# Patient Record
Sex: FEMALE | Race: WHITE | URBAN - NONMETROPOLITAN AREA
[De-identification: names, ages, dates, MRNs, and addresses within clinical notes are randomized per-mention and may not be internally consistent; named-entity substitution may affect disease eponyms.]

---

## 2020-06-25 ENCOUNTER — TELEPHONE ENCOUNTER (OUTPATIENT)
Dept: URBAN - NONMETROPOLITAN AREA CLINIC 2 | Facility: CLINIC | Age: 70
End: 2020-06-25

## 2020-08-14 ENCOUNTER — LAB OUTSIDE AN ENCOUNTER (OUTPATIENT)
Dept: URBAN - NONMETROPOLITAN AREA CLINIC 13 | Facility: CLINIC | Age: 70
End: 2020-08-14

## 2020-08-14 ENCOUNTER — OFFICE VISIT (OUTPATIENT)
Dept: URBAN - NONMETROPOLITAN AREA CLINIC 13 | Facility: CLINIC | Age: 70
End: 2020-08-14
Payer: MEDICARE

## 2020-08-14 DIAGNOSIS — Z12.11 ROUTINE COLON: ICD-10-CM

## 2020-08-14 DIAGNOSIS — R74.8 ELEVATED LIVER ENZYMES: ICD-10-CM

## 2020-08-14 DIAGNOSIS — R93.5 ABNORMAL CT OF THE ABDOMEN: ICD-10-CM

## 2020-08-14 DIAGNOSIS — K57.92 DIVERTICULITIS: ICD-10-CM

## 2020-08-14 PROCEDURE — 3017F COLORECTAL CA SCREEN DOC REV: CPT | Performed by: INTERNAL MEDICINE

## 2020-08-14 PROCEDURE — 99214 OFFICE O/P EST MOD 30 MIN: CPT | Performed by: INTERNAL MEDICINE

## 2020-08-14 PROCEDURE — G9903 PT SCRN TBCO ID AS NON USER: HCPCS | Performed by: INTERNAL MEDICINE

## 2020-08-14 PROCEDURE — G8427 DOCREV CUR MEDS BY ELIG CLIN: HCPCS | Performed by: INTERNAL MEDICINE

## 2020-08-14 RX ORDER — ESZOPICLONE 2 MG
TAKE 1 TABLET (2 MG) BY ORAL ROUTE ONCE DAILY AT BEDTIME TABLET ORAL 1
Qty: 0 | Refills: 0 | Status: ACTIVE | COMMUNITY
Start: 1900-01-01

## 2020-08-14 NOTE — HPI-TODAY'S VISIT:
The patient presents today for follow-up of diverticulitis and elevated liver enzymes.  For the past 6 to 12 months, she has been feeling like her bowels have not been normal.  This past June, she started developing severe abdominal pain.  Her primary care physician referred her to urology.  She then underwent a noncontrast CT scan that revealed perforated diverticulitis.  She took antibiotics for 2 weeks, and since that time, she has been feeling much better.  She is now taking occasional MiraLAX for her bowels.  She denies any blood in her stool.  She is willing to have a repeat colonoscopy at this time.  She does feel like things have gotten back to normal.  Also, while she was having an acute flare, she did have blood work done that revealed elevated liver enzymes.  This is the first time she is ever been noted to have abnormal labs.  She has not had them recently checked since June.  Overall, she is feeling much better today.

## 2020-09-09 ENCOUNTER — CLAIMS CREATED FROM THE CLAIM WINDOW (OUTPATIENT)
Dept: URBAN - METROPOLITAN AREA CLINIC 4 | Facility: CLINIC | Age: 70
End: 2020-09-09
Payer: MEDICARE

## 2020-09-09 ENCOUNTER — OFFICE VISIT (OUTPATIENT)
Dept: URBAN - NONMETROPOLITAN AREA SURGERY CENTER 1 | Facility: SURGERY CENTER | Age: 70
End: 2020-09-09
Payer: MEDICARE

## 2020-09-09 DIAGNOSIS — D12.2 ADENOMA OF ASCENDING COLON: ICD-10-CM

## 2020-09-09 DIAGNOSIS — D12.2 BENIGN NEOPLASM OF ASCENDING COLON: ICD-10-CM

## 2020-09-09 DIAGNOSIS — K57.30 ACQUIRED DIVERTICULOSIS OF COLON: ICD-10-CM

## 2020-09-09 PROCEDURE — G8907 PT DOC NO EVENTS ON DISCHARG: HCPCS | Performed by: INTERNAL MEDICINE

## 2020-09-09 PROCEDURE — G9937 DIG OR SURV COLSCO: HCPCS | Performed by: INTERNAL MEDICINE

## 2020-09-09 PROCEDURE — 45385 COLONOSCOPY W/LESION REMOVAL: CPT | Performed by: INTERNAL MEDICINE

## 2020-09-09 PROCEDURE — 88305 TISSUE EXAM BY PATHOLOGIST: CPT | Performed by: PATHOLOGY

## 2020-10-30 ENCOUNTER — OFFICE VISIT (OUTPATIENT)
Dept: URBAN - NONMETROPOLITAN AREA CLINIC 13 | Facility: CLINIC | Age: 70
End: 2020-10-30

## 2020-10-30 RX ORDER — ESZOPICLONE 2 MG
TAKE 1 TABLET (2 MG) BY ORAL ROUTE ONCE DAILY AT BEDTIME TABLET ORAL 1
Qty: 0 | Refills: 0 | Status: ACTIVE | COMMUNITY
Start: 1900-01-01

## 2021-01-15 ENCOUNTER — OFFICE VISIT (OUTPATIENT)
Dept: URBAN - NONMETROPOLITAN AREA CLINIC 13 | Facility: CLINIC | Age: 71
End: 2021-01-15

## 2021-02-04 ENCOUNTER — OFFICE VISIT (OUTPATIENT)
Dept: URBAN - METROPOLITAN AREA TELEHEALTH 2 | Facility: TELEHEALTH | Age: 71
End: 2021-02-04
Payer: MEDICARE

## 2021-02-04 DIAGNOSIS — R74.8 ELEVATED LIVER ENZYMES: ICD-10-CM

## 2021-02-04 DIAGNOSIS — K57.92 DIVERTICULITIS: ICD-10-CM

## 2021-02-04 DIAGNOSIS — K59.01 CONSTIPATION: ICD-10-CM

## 2021-02-04 DIAGNOSIS — Z12.11 ROUTINE COLON: ICD-10-CM

## 2021-02-04 PROCEDURE — 3017F COLORECTAL CA SCREEN DOC REV: CPT | Performed by: NURSE PRACTITIONER

## 2021-02-04 PROCEDURE — G9902 PT SCRN TBCO AND ID AS USER: HCPCS | Performed by: NURSE PRACTITIONER

## 2021-02-04 PROCEDURE — G9906 PT RECV TBCO CESS INTERV: HCPCS | Performed by: NURSE PRACTITIONER

## 2021-02-04 PROCEDURE — G8427 DOCREV CUR MEDS BY ELIG CLIN: HCPCS | Performed by: NURSE PRACTITIONER

## 2021-02-04 PROCEDURE — 99213 OFFICE O/P EST LOW 20 MIN: CPT | Performed by: NURSE PRACTITIONER

## 2021-02-04 PROCEDURE — 4004F PT TOBACCO SCREEN RCVD TLK: CPT | Performed by: NURSE PRACTITIONER

## 2021-02-04 RX ORDER — ESZOPICLONE 2 MG
TAKE 1 TABLET (2 MG) BY ORAL ROUTE ONCE DAILY AT BEDTIME TABLET ORAL 1
Qty: 0 | Refills: 0 | Status: ACTIVE | COMMUNITY
Start: 1900-01-01

## 2021-02-04 RX ORDER — PLECANATIDE 3 MG/1
1 TABLET TABLET ORAL ONCE A DAY
Qty: 90 TABLET | Refills: 3 | OUTPATIENT
Start: 2021-02-04 | End: 2022-01-30

## 2021-02-04 NOTE — HPI-TODAY'S VISIT:
The patient presents today for follow-up of diverticulitis and elevated liver enzymes.  For the past 6 to 12 months, she has been feeling like her bowels have not been normal.  This past June, she started developing severe abdominal pain.  Her primary care physician referred her to urology.  She then underwent a noncontrast CT scan that revealed perforated diverticulitis.  She took antibiotics for 2 weeks, and since that time, she has been feeling much better.  She is now taking occasional MiraLAX for her bowels.  She denies any blood in her stool.  She is willing to have a repeat colonoscopy at this time.  She does feel like things have gotten back to normal.  Also, while she was having an acute flare, she did have blood work done that revealed elevated liver enzymes.  This is the first time she is ever been noted to have abnormal labs.  She has not had them recently checked since June.  Overall, she is feeling much better today.   2/4/2021 Juliana presents for follow up of diverticulitis with microperforation and elevated liver enzymes. Her repeat colonoscopy reveals one TA and left sided diverticulosis. She is taking jon, fiber and hydration. She is still going several days with no significant BM. She is interested in meeting with a surgeon regarding her history of perforated diverticulitis. Today she is doing fairly well otherwise. MB

## 2021-02-08 ENCOUNTER — WEB ENCOUNTER (OUTPATIENT)
Dept: URBAN - NONMETROPOLITAN AREA CLINIC 2 | Facility: CLINIC | Age: 71
End: 2021-02-08

## 2021-02-11 ENCOUNTER — WEB ENCOUNTER (OUTPATIENT)
Dept: URBAN - NONMETROPOLITAN AREA CLINIC 13 | Facility: CLINIC | Age: 71
End: 2021-02-11

## 2021-02-17 ENCOUNTER — OFFICE VISIT (OUTPATIENT)
Dept: URBAN - METROPOLITAN AREA TELEHEALTH 2 | Facility: TELEHEALTH | Age: 71
End: 2021-02-17

## 2021-03-07 ENCOUNTER — WEB ENCOUNTER (OUTPATIENT)
Dept: URBAN - NONMETROPOLITAN AREA CLINIC 13 | Facility: CLINIC | Age: 71
End: 2021-03-07

## 2021-03-10 ENCOUNTER — OFFICE VISIT (OUTPATIENT)
Dept: URBAN - METROPOLITAN AREA TELEHEALTH 2 | Facility: TELEHEALTH | Age: 71
End: 2021-03-10
Payer: MEDICARE

## 2021-03-10 DIAGNOSIS — K59.01 CONSTIPATION: ICD-10-CM

## 2021-03-10 DIAGNOSIS — Z12.11 ROUTINE COLON: ICD-10-CM

## 2021-03-10 DIAGNOSIS — K57.92 DIVERTICULITIS: ICD-10-CM

## 2021-03-10 DIAGNOSIS — R74.8 ELEVATED LIVER ENZYMES: ICD-10-CM

## 2021-03-10 PROCEDURE — 99213 OFFICE O/P EST LOW 20 MIN: CPT | Performed by: NURSE PRACTITIONER

## 2021-03-10 RX ORDER — ESZOPICLONE 2 MG
TAKE 1 TABLET (2 MG) BY ORAL ROUTE ONCE DAILY AT BEDTIME TABLET ORAL 1
Qty: 0 | Refills: 0 | Status: ACTIVE | COMMUNITY
Start: 1900-01-01

## 2021-03-10 RX ORDER — PLECANATIDE 3 MG/1
1 TABLET TABLET ORAL ONCE A DAY
Qty: 90 TABLET | Refills: 3 | Status: ACTIVE | COMMUNITY
Start: 2021-02-04 | End: 2022-01-30

## 2021-03-10 NOTE — HPI-TODAY'S VISIT:
The patient presents today for follow-up of diverticulitis and elevated liver enzymes.  For the past 6 to 12 months, she has been feeling like her bowels have not been normal.  This past June, she started developing severe abdominal pain.  Her primary care physician referred her to urology.  She then underwent a noncontrast CT scan that revealed perforated diverticulitis.  She took antibiotics for 2 weeks, and since that time, she has been feeling much better.  She is now taking occasional MiraLAX for her bowels.  She denies any blood in her stool.  She is willing to have a repeat colonoscopy at this time.  She does feel like things have gotten back to normal.  Also, while she was having an acute flare, she did have blood work done that revealed elevated liver enzymes.  This is the first time she is ever been noted to have abnormal labs.  She has not had them recently checked since June.  Overall, she is feeling much better today.   2/4/2021 Juliana presents for follow up of diverticulitis with microperforation and elevated liver enzymes. Her repeat colonoscopy reveals one TA and left sided diverticulosis. She is taking jon, fiber and hydration. She is still going several days with no significant BM. She is interested in meeting with a surgeon regarding her history of perforated diverticulitis. Today she is doing fairly well otherwise. MB   3/10/2021 Juliana presents for follow up of diverticulosis/diverticulitis and alternating constipation/diarrhea. She has filled the Trulance but had severe diarrhea with daily dosing. She is now taking it every other day but is constipated. She is struggling with excessive gas. She is still struggling with her bowels and does plan on pursuing surgery. Today her bowels are still irregular but she is doing better. MB

## 2021-03-18 ENCOUNTER — WEB ENCOUNTER (OUTPATIENT)
Dept: URBAN - NONMETROPOLITAN AREA CLINIC 2 | Facility: CLINIC | Age: 71
End: 2021-03-18

## 2021-03-18 RX ORDER — ESZOPICLONE 2 MG
TAKE 1 TABLET (2 MG) BY ORAL ROUTE ONCE DAILY AT BEDTIME TABLET ORAL 1
Qty: 0 | Refills: 0 | Status: ACTIVE | COMMUNITY
Start: 1900-01-01

## 2021-03-18 RX ORDER — PLECANATIDE 3 MG/1
1 TABLET TABLET ORAL ONCE A DAY
Qty: 90 TABLET | Refills: 3 | Status: ACTIVE | COMMUNITY
Start: 2021-02-04 | End: 2022-01-30

## 2021-03-18 RX ORDER — CEFDINIR 300 MG/1
AS DIRECTED CAPSULE ORAL TID
Qty: 42 CAPSULE | Refills: 0 | OUTPATIENT
Start: 2021-03-18 | End: 2021-04-01

## 2021-03-18 RX ORDER — METRONIDAZOLE 500 MG/1
1 TABLET TABLET, FILM COATED ORAL THREE TIMES A DAY
Qty: 42 TABLET | Refills: 0 | OUTPATIENT
Start: 2021-03-18 | End: 2021-04-01

## 2021-03-19 ENCOUNTER — WEB ENCOUNTER (OUTPATIENT)
Dept: URBAN - NONMETROPOLITAN AREA CLINIC 2 | Facility: CLINIC | Age: 71
End: 2021-03-19

## 2021-03-19 RX ORDER — METRONIDAZOLE 500 MG/1
1 TABLET TABLET, FILM COATED ORAL THREE TIMES A DAY
Qty: 42 TABLET | Refills: 0
Start: 2021-03-18

## 2021-03-19 RX ORDER — CEFDINIR 300 MG/1
AS DIRECTED CAPSULE ORAL TID
Qty: 42 CAPSULE | Refills: 0
Start: 2021-03-18

## 2021-03-20 LAB
A/G RATIO: 1.5
ALBUMIN: 4.3
ALKALINE PHOSPHATASE: 80
ALT (SGPT): 24
AST (SGOT): 25
BASO (ABSOLUTE): 0
BASOS: 0
BILIRUBIN, TOTAL: 0.4
BUN/CREATININE RATIO: 19
BUN: 21
C-REACTIVE PROTEIN, QUANT: 134
CALCIUM: 9.6
CARBON DIOXIDE, TOTAL: 22
CHLORIDE: 98
CREATININE: 1.12
EGFR IF AFRICN AM: 58
EGFR IF NONAFRICN AM: 50
EOS (ABSOLUTE): 0
EOS: 0
GLOBULIN, TOTAL: 2.9
GLUCOSE: 141
HEMATOCRIT: 44.9
HEMATOLOGY COMMENTS:: (no result)
HEMOGLOBIN: 14.8
IMMATURE CELLS: (no result)
IMMATURE GRANS (ABS): 0
IMMATURE GRANULOCYTES: 0
LYMPHS (ABSOLUTE): 1
LYMPHS: 11
MCH: 29.4
MCHC: 33
MCV: 89
MONOCYTES(ABSOLUTE): 0.7
MONOCYTES: 7
NEUTROPHILS (ABSOLUTE): 7.8
NEUTROPHILS: 82
NRBC: (no result)
PLATELETS: 284
POTASSIUM: 4
PROTEIN, TOTAL: 7.2
RBC: 5.04
RDW: 12.9
SEDIMENTATION RATE-WESTERGREN: 45
SODIUM: 139
WBC: 9.5

## 2021-03-22 ENCOUNTER — WEB ENCOUNTER (OUTPATIENT)
Dept: URBAN - NONMETROPOLITAN AREA CLINIC 2 | Facility: CLINIC | Age: 71
End: 2021-03-22

## 2021-03-22 ENCOUNTER — TELEPHONE ENCOUNTER (OUTPATIENT)
Dept: URBAN - METROPOLITAN AREA CLINIC 92 | Facility: CLINIC | Age: 71
End: 2021-03-22

## 2021-03-23 ENCOUNTER — OFFICE VISIT (OUTPATIENT)
Dept: URBAN - NONMETROPOLITAN AREA CLINIC 2 | Facility: CLINIC | Age: 71
End: 2021-03-23
Payer: MEDICARE

## 2021-03-23 ENCOUNTER — WEB ENCOUNTER (OUTPATIENT)
Dept: URBAN - NONMETROPOLITAN AREA CLINIC 2 | Facility: CLINIC | Age: 71
End: 2021-03-23

## 2021-03-23 DIAGNOSIS — Z12.11 ROUTINE COLON: ICD-10-CM

## 2021-03-23 DIAGNOSIS — K57.92 DIVERTICULITIS: ICD-10-CM

## 2021-03-23 DIAGNOSIS — R93.5 ABNORMAL CT OF THE ABDOMEN: ICD-10-CM

## 2021-03-23 DIAGNOSIS — K59.01 CONSTIPATION: ICD-10-CM

## 2021-03-23 PROCEDURE — 99214 OFFICE O/P EST MOD 30 MIN: CPT | Performed by: NURSE PRACTITIONER

## 2021-03-23 RX ORDER — METRONIDAZOLE 500 MG/1
1 TABLET TABLET, FILM COATED ORAL THREE TIMES A DAY
Qty: 42 TABLET | Refills: 0 | Status: ACTIVE | COMMUNITY
Start: 2021-03-18

## 2021-03-23 RX ORDER — ESZOPICLONE 2 MG
TAKE 1 TABLET (2 MG) BY ORAL ROUTE ONCE DAILY AT BEDTIME TABLET ORAL 1
Qty: 0 | Refills: 0 | Status: ACTIVE | COMMUNITY
Start: 1900-01-01

## 2021-03-23 RX ORDER — CEFDINIR 300 MG/1
AS DIRECTED CAPSULE ORAL TID
Qty: 42 CAPSULE | Refills: 0 | Status: ACTIVE | COMMUNITY
Start: 2021-03-18

## 2021-03-23 RX ORDER — PLECANATIDE 3 MG/1
1 TABLET TABLET ORAL ONCE A DAY
Qty: 90 TABLET | Refills: 3 | Status: ACTIVE | COMMUNITY
Start: 2021-02-04 | End: 2022-01-30

## 2021-03-23 NOTE — HPI-TODAY'S VISIT:
The patient presents today for follow-up of diverticulitis and elevated liver enzymes.  For the past 6 to 12 months, she has been feeling like her bowels have not been normal.  This past June, she started developing severe abdominal pain.  Her primary care physician referred her to urology.  She then underwent a noncontrast CT scan that revealed perforated diverticulitis.  She took antibiotics for 2 weeks, and since that time, she has been feeling much better.  She is now taking occasional MiraLAX for her bowels.  She denies any blood in her stool.  She is willing to have a repeat colonoscopy at this time.  She does feel like things have gotten back to normal.  Also, while she was having an acute flare, she did have blood work done that revealed elevated liver enzymes.  This is the first time she is ever been noted to have abnormal labs.  She has not had them recently checked since June.  Overall, she is feeling much better today.   2/4/2021 Juliana presents for follow up of diverticulitis with microperforation and elevated liver enzymes. Her repeat colonoscopy reveals one TA and left sided diverticulosis. She is taking jon, fiber and hydration. She is still going several days with no significant BM. She is interested in meeting with a surgeon regarding her history of perforated diverticulitis. Today she is doing fairly well otherwise. MB   3/10/2021 Juliana presents for follow up of diverticulosis/diverticulitis and alternating constipation/diarrhea. She has filled the Trulance but had severe diarrhea with daily dosing. She is now taking it every other day but is constipated. She is struggling with excessive gas. She is still struggling with her bowels and does plan on pursuing surgery. Today her bowels are still irregular but she is doing better. MB  3/23/2021 Juliana presents for follow-up of diverticulitis.  Last week she developed recurrent left lower quadrant abdominal pain with severe discomfort.  She called and we started antibiotics.  We did check her labs and her CRP was over 100.  Since starting the antibiotics her pain has improved tremendously.  She is having multiple watery loose and at times incontinent stools.  She is off her MiraLAX and fiber currently she is taking Flagyl and cefdinir.  Today she is feeling better, she did meet with Dr. Grossman regarding surgery and had deferred at her last visit.  Today we discussed her elevated CRP, the fact that this could be due to an abscess, and her risk of perforation given her history of microperforation and elevated CRP.  She agrees to repeat blood work today and consider repeat CT scan pending results.  MB

## 2021-03-24 ENCOUNTER — TELEPHONE ENCOUNTER (OUTPATIENT)
Dept: URBAN - NONMETROPOLITAN AREA CLINIC 2 | Facility: CLINIC | Age: 71
End: 2021-03-24

## 2021-03-24 LAB
A/G RATIO: 1.5
ALBUMIN: 4.4
ALKALINE PHOSPHATASE: 68
ALT (SGPT): 39
AST (SGOT): 66
BASO (ABSOLUTE): 0
BASOS: 0
BILIRUBIN, TOTAL: 0.2
BUN/CREATININE RATIO: 11
BUN: 12
C-REACTIVE PROTEIN, QUANT: 11
CALCIUM: 10.4
CARBON DIOXIDE, TOTAL: 27
CHLORIDE: 104
CREATININE: 1.05
EGFR IF AFRICN AM: 62
EGFR IF NONAFRICN AM: 54
EOS (ABSOLUTE): 0.1
EOS: 1
GLOBULIN, TOTAL: 2.9
GLUCOSE: 120
HEMATOCRIT: 42.8
HEMATOLOGY COMMENTS:: (no result)
HEMOGLOBIN: 14
IMMATURE CELLS: (no result)
IMMATURE GRANS (ABS): 0
IMMATURE GRANULOCYTES: 0
LYMPHS (ABSOLUTE): 1.8
LYMPHS: 27
MCH: 29.4
MCHC: 32.7
MCV: 90
MONOCYTES(ABSOLUTE): 0.6
MONOCYTES: 9
NEUTROPHILS (ABSOLUTE): 4.1
NEUTROPHILS: 63
NRBC: (no result)
PLATELETS: 330
POTASSIUM: 4.9
PROTEIN, TOTAL: 7.3
RBC: 4.77
RDW: 12.7
SEDIMENTATION RATE-WESTERGREN: 10
SODIUM: 146
WBC: 6.6

## 2021-03-24 RX ORDER — METRONIDAZOLE 500 MG/1
1 TABLET TABLET, FILM COATED ORAL THREE TIMES A DAY
Qty: 42 TABLET | Refills: 0
Start: 2021-03-18

## 2021-03-24 RX ORDER — CEFDINIR 300 MG/1
AS DIRECTED CAPSULE ORAL TID
Qty: 42 CAPSULE | Refills: 0
Start: 2021-03-18

## 2021-03-26 ENCOUNTER — WEB ENCOUNTER (OUTPATIENT)
Dept: URBAN - NONMETROPOLITAN AREA CLINIC 2 | Facility: CLINIC | Age: 71
End: 2021-03-26

## 2021-03-29 ENCOUNTER — WEB ENCOUNTER (OUTPATIENT)
Dept: URBAN - NONMETROPOLITAN AREA CLINIC 2 | Facility: CLINIC | Age: 71
End: 2021-03-29

## 2021-03-30 LAB
C DIFFICILE TOXINS A+B, EIA: NEGATIVE
CAMPYLOBACTER CULTURE: (no result)
E COLI SHIGA TOXIN EIA: NEGATIVE
Lab: (no result)
OVA + PARASITE EXAM: (no result)
SALMONELLA/SHIGELLA SCREEN: (no result)
WHITE BLOOD CELLS (WBC), STOOL: (no result)

## 2021-04-01 ENCOUNTER — WEB ENCOUNTER (OUTPATIENT)
Dept: URBAN - NONMETROPOLITAN AREA CLINIC 2 | Facility: CLINIC | Age: 71
End: 2021-04-01

## 2021-04-12 ENCOUNTER — OFFICE VISIT (OUTPATIENT)
Dept: URBAN - NONMETROPOLITAN AREA CLINIC 2 | Facility: CLINIC | Age: 71
End: 2021-04-12
Payer: MEDICARE

## 2021-04-12 DIAGNOSIS — K59.01 CONSTIPATION: ICD-10-CM

## 2021-04-12 DIAGNOSIS — Z12.11 ROUTINE COLON: ICD-10-CM

## 2021-04-12 DIAGNOSIS — K57.92 DIVERTICULITIS: ICD-10-CM

## 2021-04-12 DIAGNOSIS — R93.5 ABNORMAL CT OF THE ABDOMEN: ICD-10-CM

## 2021-04-12 PROCEDURE — 99213 OFFICE O/P EST LOW 20 MIN: CPT | Performed by: NURSE PRACTITIONER

## 2021-04-12 RX ORDER — METRONIDAZOLE 500 MG/1
1 TABLET TABLET, FILM COATED ORAL THREE TIMES A DAY
Qty: 42 TABLET | Refills: 0 | Status: ON HOLD | COMMUNITY
Start: 2021-03-18

## 2021-04-12 RX ORDER — ESZOPICLONE 2 MG
TAKE 1 TABLET (2 MG) BY ORAL ROUTE ONCE DAILY AT BEDTIME TABLET ORAL 1
Qty: 0 | Refills: 0 | Status: ACTIVE | COMMUNITY
Start: 1900-01-01

## 2021-04-12 RX ORDER — CEFDINIR 300 MG/1
AS DIRECTED CAPSULE ORAL TID
Qty: 42 CAPSULE | Refills: 0 | Status: ON HOLD | COMMUNITY
Start: 2021-03-18

## 2021-04-12 RX ORDER — PLECANATIDE 3 MG/1
1 TABLET TABLET ORAL ONCE A DAY
Qty: 90 TABLET | Refills: 3 | Status: ACTIVE | COMMUNITY
Start: 2021-02-04 | End: 2022-01-30

## 2021-04-12 NOTE — HPI-TODAY'S VISIT:
The patient presents today for follow-up of diverticulitis and elevated liver enzymes.  For the past 6 to 12 months, she has been feeling like her bowels have not been normal.  This past June, she started developing severe abdominal pain.  Her primary care physician referred her to urology.  She then underwent a noncontrast CT scan that revealed perforated diverticulitis.  She took antibiotics for 2 weeks, and since that time, she has been feeling much better.  She is now taking occasional MiraLAX for her bowels.  She denies any blood in her stool.  She is willing to have a repeat colonoscopy at this time.  She does feel like things have gotten back to normal.  Also, while she was having an acute flare, she did have blood work done that revealed elevated liver enzymes.  This is the first time she is ever been noted to have abnormal labs.  She has not had them recently checked since June.  Overall, she is feeling much better today.   2/4/2021 Juliana presents for follow up of diverticulitis with microperforation and elevated liver enzymes. Her repeat colonoscopy reveals one TA and left sided diverticulosis. She is taking jon, fiber and hydration. She is still going several days with no significant BM. She is interested in meeting with a surgeon regarding her history of perforated diverticulitis. Today she is doing fairly well otherwise. MB   3/10/2021 Juliana presents for follow up of diverticulosis/diverticulitis and alternating constipation/diarrhea. She has filled the Trulance but had severe diarrhea with daily dosing. She is now taking it every other day but is constipated. She is struggling with excessive gas. She is still struggling with her bowels and does plan on pursuing surgery. Today her bowels are still irregular but she is doing better. MB  3/23/2021 Juliana presents for follow-up of diverticulitis.  Last week she developed recurrent left lower quadrant abdominal pain with severe discomfort.  She called and we started antibiotics.  We did check her labs and her CRP was over 100.  Since starting the antibiotics her pain has improved tremendously.  She is having multiple watery loose and at times incontinent stools.  She is off her MiraLAX and fiber currently she is taking Flagyl and cefdinir.  Today she is feeling better, she did meet with Dr. Grossman regarding surgery and had deferred at her last visit.  Today we discussed her elevated CRP, the fact that this could be due to an abscess, and her risk of perforation given her history of microperforation and elevated CRP.  She agrees to repeat blood work today and consider repeat CT scan pending results.  MB  4/12/2021 Mrs. Adkins presents for follow-up of diverticulitis.  Since her last visit she has completed her antibiotics.  Her bowels are soft and moving daily 1-3 times.  She has not restarted the psyllium but does plan on starting that this evening.  She has increased her hydration, she cannot quite make 64 ounces daily but she can get up to 40 ounces and her bowel movements have improved tremendously.  Her colonoscopy earlier this year reveals 1 TA and left-sided diverticular disease as above.  She does have a history of microperforation on CT scan with recurrent need of antibiotics this year.  Today we had a long discussion regarding recurrent diverticulitis.  She does have an appointment with Dr. Marybeth Recinos next week to discuss surgical options, we did discuss prevention with psyllium fiber and she agrees to take this regularly.  Today she is doing much better with no new GI complaints.  MB

## 2021-04-13 LAB
A/G RATIO: 1.7
ALBUMIN: 4.4
ALKALINE PHOSPHATASE: 54
ALT (SGPT): 30
AST (SGOT): 38
BASO (ABSOLUTE): 0
BASOS: 0
BILIRUBIN, TOTAL: <0.2
BUN/CREATININE RATIO: 15
BUN: 14
CALCIUM: 9.6
CARBON DIOXIDE, TOTAL: 25
CHLORIDE: 100
CREATININE: 0.91
EGFR IF AFRICN AM: 74
EGFR IF NONAFRICN AM: 64
EOS (ABSOLUTE): 0.1
EOS: 1
GLOBULIN, TOTAL: 2.6
GLUCOSE: 98
HEMATOCRIT: 44.2
HEMATOLOGY COMMENTS:: (no result)
HEMOGLOBIN: 14.5
IMMATURE CELLS: (no result)
IMMATURE GRANS (ABS): 0
IMMATURE GRANULOCYTES: 0
LYMPHS (ABSOLUTE): 1.2
LYMPHS: 22
MCH: 30.3
MCHC: 32.8
MCV: 92
MONOCYTES(ABSOLUTE): 0.5
MONOCYTES: 8
NEUTROPHILS (ABSOLUTE): 3.9
NEUTROPHILS: 69
NRBC: (no result)
PLATELETS: 340
POTASSIUM: 4.7
PROTEIN, TOTAL: 7
RBC: 4.79
RDW: 13.1
SODIUM: 141
WBC: 5.6

## 2021-04-21 ENCOUNTER — WEB ENCOUNTER (OUTPATIENT)
Dept: URBAN - NONMETROPOLITAN AREA CLINIC 2 | Facility: CLINIC | Age: 71
End: 2021-04-21

## 2021-04-22 ENCOUNTER — WEB ENCOUNTER (OUTPATIENT)
Dept: URBAN - NONMETROPOLITAN AREA CLINIC 2 | Facility: CLINIC | Age: 71
End: 2021-04-22

## 2021-05-05 ENCOUNTER — WEB ENCOUNTER (OUTPATIENT)
Dept: URBAN - NONMETROPOLITAN AREA CLINIC 2 | Facility: CLINIC | Age: 71
End: 2021-05-05

## 2021-05-07 ENCOUNTER — WEB ENCOUNTER (OUTPATIENT)
Dept: URBAN - NONMETROPOLITAN AREA CLINIC 2 | Facility: CLINIC | Age: 71
End: 2021-05-07

## 2021-05-18 ENCOUNTER — OFFICE VISIT (OUTPATIENT)
Dept: URBAN - NONMETROPOLITAN AREA CLINIC 2 | Facility: CLINIC | Age: 71
End: 2021-05-18

## 2021-06-21 ENCOUNTER — WEB ENCOUNTER (OUTPATIENT)
Dept: URBAN - NONMETROPOLITAN AREA CLINIC 2 | Facility: CLINIC | Age: 71
End: 2021-06-21

## 2021-08-17 ENCOUNTER — WEB ENCOUNTER (OUTPATIENT)
Dept: URBAN - NONMETROPOLITAN AREA CLINIC 2 | Facility: CLINIC | Age: 71
End: 2021-08-17

## 2021-08-17 ENCOUNTER — OFFICE VISIT (OUTPATIENT)
Dept: URBAN - NONMETROPOLITAN AREA CLINIC 2 | Facility: CLINIC | Age: 71
End: 2021-08-17
Payer: MEDICARE

## 2021-08-17 VITALS
HEIGHT: 64 IN | WEIGHT: 140 LBS | BODY MASS INDEX: 23.9 KG/M2 | SYSTOLIC BLOOD PRESSURE: 149 MMHG | DIASTOLIC BLOOD PRESSURE: 81 MMHG | TEMPERATURE: 97.5 F | HEART RATE: 69 BPM

## 2021-08-17 DIAGNOSIS — R93.5 ABNORMAL CT OF THE ABDOMEN: ICD-10-CM

## 2021-08-17 DIAGNOSIS — K59.01 CONSTIPATION: ICD-10-CM

## 2021-08-17 DIAGNOSIS — K57.92 DIVERTICULITIS: ICD-10-CM

## 2021-08-17 DIAGNOSIS — Z12.11 ROUTINE COLON: ICD-10-CM

## 2021-08-17 PROBLEM — 15634181000119107: Status: ACTIVE | Noted: 2020-08-14

## 2021-08-17 PROCEDURE — 99214 OFFICE O/P EST MOD 30 MIN: CPT | Performed by: NURSE PRACTITIONER

## 2021-08-17 RX ORDER — METRONIDAZOLE 500 MG/1
1 TABLET TABLET, FILM COATED ORAL THREE TIMES A DAY
Qty: 42 TABLET | Refills: 0 | Status: ON HOLD | COMMUNITY
Start: 2021-03-18

## 2021-08-17 RX ORDER — CEFDINIR 300 MG/1
AS DIRECTED CAPSULE ORAL TID
Qty: 42 CAPSULE | Refills: 0 | Status: ON HOLD | COMMUNITY
Start: 2021-03-18

## 2021-08-17 RX ORDER — ESZOPICLONE 2 MG
TAKE 1 TABLET (2 MG) BY ORAL ROUTE ONCE DAILY AT BEDTIME TABLET ORAL 1
Qty: 0 | Refills: 0 | Status: ACTIVE | COMMUNITY
Start: 1900-01-01

## 2021-08-17 RX ORDER — PLECANATIDE 3 MG/1
1 TABLET TABLET ORAL ONCE A DAY
Qty: 90 TABLET | Refills: 3 | Status: ACTIVE | COMMUNITY
Start: 2021-02-04 | End: 2022-01-30

## 2021-08-17 NOTE — HPI-TODAY'S VISIT:
The patient presents today for follow-up of diverticulitis and elevated liver enzymes.  For the past 6 to 12 months, she has been feeling like her bowels have not been normal.  This past June, she started developing severe abdominal pain.  Her primary care physician referred her to urology.  She then underwent a noncontrast CT scan that revealed perforated diverticulitis.  She took antibiotics for 2 weeks, and since that time, she has been feeling much better.  She is now taking occasional MiraLAX for her bowels.  She denies any blood in her stool.  She is willing to have a repeat colonoscopy at this time.  She does feel like things have gotten back to normal.  Also, while she was having an acute flare, she did have blood work done that revealed elevated liver enzymes.  This is the first time she is ever been noted to have abnormal labs.  She has not had them recently checked since June.  Overall, she is feeling much better today.   2/4/2021 Juliana presents for follow up of diverticulitis with microperforation and elevated liver enzymes. Her repeat colonoscopy reveals one TA and left sided diverticulosis. She is taking jon, fiber and hydration. She is still going several days with no significant BM. She is interested in meeting with a surgeon regarding her history of perforated diverticulitis. Today she is doing fairly well otherwise. MB   3/10/2021 Juliana presents for follow up of diverticulosis/diverticulitis and alternating constipation/diarrhea. She has filled the Trulance but had severe diarrhea with daily dosing. She is now taking it every other day but is constipated. She is struggling with excessive gas. She is still struggling with her bowels and does plan on pursuing surgery. Today her bowels are still irregular but she is doing better. MB  3/23/2021 Jluiana presents for follow-up of diverticulitis.  Last week she developed recurrent left lower quadrant abdominal pain with severe discomfort.  She called and we started antibiotics.  We did check her labs and her CRP was over 100.  Since starting the antibiotics her pain has improved tremendously.  She is having multiple watery loose and at times incontinent stools.  She is off her MiraLAX and fiber currently she is taking Flagyl and cefdinir.  Today she is feeling better, she did meet with Dr. Grossman regarding surgery and had deferred at her last visit.  Today we discussed her elevated CRP, the fact that this could be due to an abscess, and her risk of perforation given her history of microperforation and elevated CRP.  She agrees to repeat blood work today and consider repeat CT scan pending results.  MB  4/12/2021 Mrs. Adkins presents for follow-up of diverticulitis.  Since her last visit she has completed her antibiotics.  Her bowels are soft and moving daily 1-3 times.  She has not restarted the psyllium but does plan on starting that this evening.  She has increased her hydration, she cannot quite make 64 ounces daily but she can get up to 40 ounces and her bowel movements have improved tremendously.  Her colonoscopy earlier this year reveals 1 TA and left-sided diverticular disease as above.  She does have a history of microperforation on CT scan with recurrent need of antibiotics this year.  Today we had a long discussion regarding recurrent diverticulitis.  She does have an appointment with Dr. Marybeth Recinos next week to discuss surgical options, we did discuss prevention with psyllium fiber and she agrees to take this regularly.  Today she is doing much better with no new GI complaints.  MB   8/17/2021 Yari presents for follow-up of history of perforated diverticulitis status post sigmoid resection.  She is 6 weeks postop and doing wonderful.  Dr. Babcock performed a sigmoid resection on July 7 with no significant postop complications.  She is back on her psyllium and having 2-3 loose soft bowel movements daily.  She has no more abdominal pain and is feeling wonderful postop.  Today we had a long discussion regarding diverticular disease and her recent surgery, she understands that she needs to continue psyllium based fiber.  Today she has no new GI complaints.  MB

## 2021-10-12 ENCOUNTER — OFFICE VISIT (OUTPATIENT)
Dept: URBAN - NONMETROPOLITAN AREA CLINIC 2 | Facility: CLINIC | Age: 71
End: 2021-10-12

## 2021-10-25 ENCOUNTER — WEB ENCOUNTER (OUTPATIENT)
Dept: URBAN - NONMETROPOLITAN AREA CLINIC 2 | Facility: CLINIC | Age: 71
End: 2021-10-25

## 2023-05-03 ENCOUNTER — OFFICE VISIT (OUTPATIENT)
Dept: URBAN - METROPOLITAN AREA TELEHEALTH 2 | Facility: TELEHEALTH | Age: 73
End: 2023-05-03

## 2023-10-18 ENCOUNTER — LAB OUTSIDE AN ENCOUNTER (OUTPATIENT)
Dept: URBAN - METROPOLITAN AREA TELEHEALTH 2 | Facility: TELEHEALTH | Age: 73
End: 2023-10-18

## 2023-10-18 ENCOUNTER — OFFICE VISIT (OUTPATIENT)
Dept: URBAN - METROPOLITAN AREA TELEHEALTH 2 | Facility: TELEHEALTH | Age: 73
End: 2023-10-18
Payer: MEDICARE

## 2023-10-18 ENCOUNTER — DASHBOARD ENCOUNTERS (OUTPATIENT)
Age: 73
End: 2023-10-18

## 2023-10-18 ENCOUNTER — TELEPHONE ENCOUNTER (OUTPATIENT)
Dept: URBAN - NONMETROPOLITAN AREA CLINIC 2 | Facility: CLINIC | Age: 73
End: 2023-10-18

## 2023-10-18 DIAGNOSIS — K57.52 DIVERTICULITIS OF BOTH SMALL AND LARGE INTESTINE: ICD-10-CM

## 2023-10-18 DIAGNOSIS — K59.01 CONSTIPATION: ICD-10-CM

## 2023-10-18 DIAGNOSIS — Z12.11 ROUTINE COLON: ICD-10-CM

## 2023-10-18 DIAGNOSIS — K59.09 CHANGE IN BOWEL MOVEMENTS INTERMITTENT CONSTIPATION. URGENCY IN THE MORNING.: ICD-10-CM

## 2023-10-18 DIAGNOSIS — R93.5 ABNORMAL CT OF THE ABDOMEN: ICD-10-CM

## 2023-10-18 PROBLEM — 307496006: Status: ACTIVE | Noted: 2020-08-14

## 2023-10-18 PROBLEM — 88111009: Status: ACTIVE | Noted: 2023-10-18

## 2023-10-18 PROBLEM — 14760008 CONSTIPATION: Status: ACTIVE | Noted: 2021-02-04

## 2023-10-18 PROCEDURE — 99214 OFFICE O/P EST MOD 30 MIN: CPT | Performed by: NURSE PRACTITIONER

## 2023-10-18 RX ORDER — METRONIDAZOLE 500 MG/1
1 TABLET TABLET, FILM COATED ORAL THREE TIMES A DAY
Qty: 42 TABLET | Refills: 0 | Status: ON HOLD | COMMUNITY
Start: 2021-03-18

## 2023-10-18 RX ORDER — SODIUM PICOSULFATE, MAGNESIUM OXIDE, AND ANHYDROUS CITRIC ACID 12; 3.5; 1 G/175ML; G/175ML; MG/175ML
175 ML THE FIRST DOSE THE EVENING BEFORE AND SECOND DOSE THE MORNING OF COLONOSCOPY LIQUID ORAL ONCE A DAY
Qty: 350 MILLILITER | Refills: 0 | OUTPATIENT
Start: 2023-10-18 | End: 2023-10-20

## 2023-10-18 RX ORDER — CEFDINIR 300 MG/1
AS DIRECTED CAPSULE ORAL TID
Qty: 42 CAPSULE | Refills: 0 | Status: ON HOLD | COMMUNITY
Start: 2021-03-18

## 2023-10-18 RX ORDER — ESZOPICLONE 2 MG
TAKE 1 TABLET (2 MG) BY ORAL ROUTE ONCE DAILY AT BEDTIME TABLET ORAL 1
Qty: 0 | Refills: 0 | Status: ACTIVE | COMMUNITY
Start: 1900-01-01

## 2023-10-18 NOTE — HPI-TODAY'S VISIT:
The patient presents today for follow-up of diverticulitis and elevated liver enzymes.  For the past 6 to 12 months, she has been feeling like her bowels have not been normal.  This past June, she started developing severe abdominal pain.  Her primary care physician referred her to urology.  She then underwent a noncontrast CT scan that revealed perforated diverticulitis.  She took antibiotics for 2 weeks, and since that time, she has been feeling much better.  She is now taking occasional MiraLAX for her bowels.  She denies any blood in her stool.  She is willing to have a repeat colonoscopy at this time.  She does feel like things have gotten back to normal.  Also, while she was having an acute flare, she did have blood work done that revealed elevated liver enzymes.  This is the first time she is ever been noted to have abnormal labs.  She has not had them recently checked since June.  Overall, she is feeling much better today.   2/4/2021 Juliana presents for follow up of diverticulitis with microperforation and elevated liver enzymes. Her repeat colonoscopy reveals one TA and left sided diverticulosis. She is taking jon, fiber and hydration. She is still going several days with no significant BM. She is interested in meeting with a surgeon regarding her history of perforated diverticulitis. Today she is doing fairly well otherwise. MB   3/10/2021 Juliana presents for follow up of diverticulosis/diverticulitis and alternating constipation/diarrhea. She has filled the Trulance but had severe diarrhea with daily dosing. She is now taking it every other day but is constipated. She is struggling with excessive gas. She is still struggling with her bowels and does plan on pursuing surgery. Today her bowels are still irregular but she is doing better. MB  3/23/2021 Juliana presents for follow-up of diverticulitis.  Last week she developed recurrent left lower quadrant abdominal pain with severe discomfort.  She called and we started antibiotics.  We did check her labs and her CRP was over 100.  Since starting the antibiotics her pain has improved tremendously.  She is having multiple watery loose and at times incontinent stools.  She is off her MiraLAX and fiber currently she is taking Flagyl and cefdinir.  Today she is feeling better, she did meet with Dr. Grossman regarding surgery and had deferred at her last visit.  Today we discussed her elevated CRP, the fact that this could be due to an abscess, and her risk of perforation given her history of microperforation and elevated CRP.  She agrees to repeat blood work today and consider repeat CT scan pending results.  MB  4/12/2021 Mrs. Adkins presents for follow-up of diverticulitis.  Since her last visit she has completed her antibiotics.  Her bowels are soft and moving daily 1-3 times.  She has not restarted the psyllium but does plan on starting that this evening.  She has increased her hydration, she cannot quite make 64 ounces daily but she can get up to 40 ounces and her bowel movements have improved tremendously.  Her colonoscopy earlier this year reveals 1 TA and left-sided diverticular disease as above.  She does have a history of microperforation on CT scan with recurrent need of antibiotics this year.  Today we had a long discussion regarding recurrent diverticulitis.  She does have an appointment with Dr. Marybeth Recinos next week to discuss surgical options, we did discuss prevention with psyllium fiber and she agrees to take this regularly.  Today she is doing much better with no new GI complaints.  MB   8/17/2021 Yari presents for follow-up of history of perforated diverticulitis status post sigmoid resection.  She is 6 weeks postop and doing wonderful.  Dr. Babcock performed a sigmoid resection on July 7 with no significant postop complications.  She is back on her psyllium and having 2-3 loose soft bowel movements daily.  She has no more abdominal pain and is feeling wonderful postop.  Today we had a long discussion regarding diverticular disease and her recent surgery, she understands that she needs to continue psyllium based fiber.  Today she has no new GI complaints.  MB 10/18/2023 Juliana presents for follow-up of history of diverticulitis and change in bowel habits.  Since her last visit she has been doing well on psyllium 1 capsule twice daily.  Recently she has noticed increased incomplete evacuation with up to 5 bowel movements daily and alternating constipation with diarrhea.  Her last Colonoscopy Was Done Preop from Her Perforated Diverticulitis and Sigmoid Resection.  She Feels That Her Bowels Are Abnormal and Irregular.  She Denies Any Abdominal Pain.  She Is Very Concerned about a Recurrence of Diverticular Disease.  Today We Discussed the Bowel Regimen.  She Will Start a Half Capful of MiraLAX Daily.  We Will Schedule Repeat Colonoscopy Given Her Change in Bowel Habits.  MB

## 2023-10-22 ENCOUNTER — WEB ENCOUNTER (OUTPATIENT)
Dept: URBAN - NONMETROPOLITAN AREA CLINIC 2 | Facility: CLINIC | Age: 73
End: 2023-10-22

## 2024-01-11 ENCOUNTER — WEB ENCOUNTER (OUTPATIENT)
Dept: URBAN - NONMETROPOLITAN AREA CLINIC 2 | Facility: CLINIC | Age: 74
End: 2024-01-11

## 2024-01-26 ENCOUNTER — OFFICE VISIT (OUTPATIENT)
Dept: URBAN - NONMETROPOLITAN AREA SURGERY CENTER 1 | Facility: SURGERY CENTER | Age: 74
End: 2024-01-26
Payer: MEDICARE

## 2024-01-26 ENCOUNTER — CLAIMS CREATED FROM THE CLAIM WINDOW (OUTPATIENT)
Dept: URBAN - METROPOLITAN AREA CLINIC 4 | Facility: CLINIC | Age: 74
End: 2024-01-26
Payer: MEDICARE

## 2024-01-26 DIAGNOSIS — R19.4 ALTERATION IN BOWEL ELIMINATION: ICD-10-CM

## 2024-01-26 DIAGNOSIS — K63.89 OTHER SPECIFIED DISEASES OF INTESTINE: ICD-10-CM

## 2024-01-26 DIAGNOSIS — Z98.0 INTESTINAL ANASTOMOSIS PRESENT: ICD-10-CM

## 2024-01-26 DIAGNOSIS — K57.30 DIVERTICULOSIS OF COLON: ICD-10-CM

## 2024-01-26 DIAGNOSIS — Z12.11 ENCOUNTER FOR COLORECTAL CANCER SCREENING: ICD-10-CM

## 2024-01-26 PROCEDURE — 88342 IMHCHEM/IMCYTCHM 1ST ANTB: CPT | Performed by: PATHOLOGY

## 2024-01-26 PROCEDURE — G8907 PT DOC NO EVENTS ON DISCHARG: HCPCS | Performed by: CLINIC/CENTER

## 2024-01-26 PROCEDURE — 00812 ANES LWR INTST SCR COLSC: CPT | Performed by: NURSE ANESTHETIST, CERTIFIED REGISTERED

## 2024-01-26 PROCEDURE — 45380 COLONOSCOPY AND BIOPSY: CPT | Performed by: INTERNAL MEDICINE

## 2024-01-26 PROCEDURE — 45380 COLONOSCOPY AND BIOPSY: CPT | Performed by: CLINIC/CENTER

## 2024-01-26 PROCEDURE — 88313 SPECIAL STAINS GROUP 2: CPT | Performed by: PATHOLOGY

## 2024-01-26 PROCEDURE — 88305 TISSUE EXAM BY PATHOLOGIST: CPT | Performed by: PATHOLOGY

## 2024-01-26 PROCEDURE — 00811 ANES LWR INTST NDSC NOS: CPT | Performed by: NURSE ANESTHETIST, CERTIFIED REGISTERED

## 2024-01-26 RX ORDER — METRONIDAZOLE 500 MG/1
1 TABLET TABLET, FILM COATED ORAL THREE TIMES A DAY
Qty: 42 TABLET | Refills: 0 | Status: ON HOLD | COMMUNITY
Start: 2021-03-18

## 2024-01-26 RX ORDER — CEFDINIR 300 MG/1
AS DIRECTED CAPSULE ORAL TID
Qty: 42 CAPSULE | Refills: 0 | Status: ON HOLD | COMMUNITY
Start: 2021-03-18

## 2024-01-26 RX ORDER — ESZOPICLONE 2 MG
TAKE 1 TABLET (2 MG) BY ORAL ROUTE ONCE DAILY AT BEDTIME TABLET ORAL 1
Qty: 0 | Refills: 0 | Status: ACTIVE | COMMUNITY
Start: 1900-01-01

## 2024-04-30 ENCOUNTER — OV EP (OUTPATIENT)
Dept: URBAN - NONMETROPOLITAN AREA CLINIC 2 | Facility: CLINIC | Age: 74
End: 2024-04-30

## 2024-07-02 ENCOUNTER — OFFICE VISIT (OUTPATIENT)
Dept: URBAN - NONMETROPOLITAN AREA CLINIC 2 | Facility: CLINIC | Age: 74
End: 2024-07-02
Payer: MEDICARE

## 2024-07-02 VITALS
WEIGHT: 151.2 LBS | HEIGHT: 64 IN | HEART RATE: 70 BPM | BODY MASS INDEX: 25.81 KG/M2 | SYSTOLIC BLOOD PRESSURE: 157 MMHG | DIASTOLIC BLOOD PRESSURE: 82 MMHG

## 2024-07-02 DIAGNOSIS — R93.5 ABNORMAL CT OF THE ABDOMEN: ICD-10-CM

## 2024-07-02 DIAGNOSIS — K57.32 SIGMOID DIVERTICULITIS: ICD-10-CM

## 2024-07-02 DIAGNOSIS — K59.01 CONSTIPATION: ICD-10-CM

## 2024-07-02 PROCEDURE — 99214 OFFICE O/P EST MOD 30 MIN: CPT | Performed by: NURSE PRACTITIONER

## 2024-07-02 RX ORDER — METRONIDAZOLE 500 MG/1
1 TABLET TABLET, FILM COATED ORAL THREE TIMES A DAY
Qty: 42 TABLET | Refills: 0 | Status: ON HOLD | COMMUNITY
Start: 2021-03-18

## 2024-07-02 RX ORDER — CEFDINIR 300 MG/1
AS DIRECTED CAPSULE ORAL TID
Qty: 42 CAPSULE | Refills: 0 | Status: ON HOLD | COMMUNITY
Start: 2021-03-18

## 2024-07-02 RX ORDER — ESZOPICLONE 2 MG
TAKE 1 TABLET (2 MG) BY ORAL ROUTE ONCE DAILY AT BEDTIME TABLET ORAL 1
Qty: 0 | Refills: 0 | Status: ACTIVE | COMMUNITY
Start: 1900-01-01

## 2024-07-02 NOTE — HPI-TODAY'S VISIT:
The patient presents today for follow-up of diverticulitis and elevated liver enzymes.  For the past 6 to 12 months, she has been feeling like her bowels have not been normal.  This past June, she started developing severe abdominal pain.  Her primary care physician referred her to urology.  She then underwent a noncontrast CT scan that revealed perforated diverticulitis.  She took antibiotics for 2 weeks, and since that time, she has been feeling much better.  She is now taking occasional MiraLAX for her bowels.  She denies any blood in her stool.  She is willing to have a repeat colonoscopy at this time.  She does feel like things have gotten back to normal.  Also, while she was having an acute flare, she did have blood work done that revealed elevated liver enzymes.  This is the first time she is ever been noted to have abnormal labs.  She has not had them recently checked since June.  Overall, she is feeling much better today.   2/4/2021 Juliana presents for follow up of diverticulitis with microperforation and elevated liver enzymes. Her repeat colonoscopy reveals one TA and left sided diverticulosis. She is taking jon, fiber and hydration. She is still going several days with no significant BM. She is interested in meeting with a surgeon regarding her history of perforated diverticulitis. Today she is doing fairly well otherwise. MB   3/10/2021 Juliana presents for follow up of diverticulosis/diverticulitis and alternating constipation/diarrhea. She has filled the Trulance but had severe diarrhea with daily dosing. She is now taking it every other day but is constipated. She is struggling with excessive gas. She is still struggling with her bowels and does plan on pursuing surgery. Today her bowels are still irregular but she is doing better. MB  3/23/2021 Juliana presents for follow-up of diverticulitis.  Last week she developed recurrent left lower quadrant abdominal pain with severe discomfort.  She called and we started antibiotics.  We did check her labs and her CRP was over 100.  Since starting the antibiotics her pain has improved tremendously.  She is having multiple watery loose and at times incontinent stools.  She is off her MiraLAX and fiber currently she is taking Flagyl and cefdinir.  Today she is feeling better, she did meet with Dr. Grossman regarding surgery and had deferred at her last visit.  Today we discussed her elevated CRP, the fact that this could be due to an abscess, and her risk of perforation given her history of microperforation and elevated CRP.  She agrees to repeat blood work today and consider repeat CT scan pending results.  MB  4/12/2021 Mrs. Adkisn presents for follow-up of diverticulitis.  Since her last visit she has completed her antibiotics.  Her bowels are soft and moving daily 1-3 times.  She has not restarted the psyllium but does plan on starting that this evening.  She has increased her hydration, she cannot quite make 64 ounces daily but she can get up to 40 ounces and her bowel movements have improved tremendously.  Her colonoscopy earlier this year reveals 1 TA and left-sided diverticular disease as above.  She does have a history of microperforation on CT scan with recurrent need of antibiotics this year.  Today we had a long discussion regarding recurrent diverticulitis.  She does have an appointment with Dr. Marybeth Recinos next week to discuss surgical options, we did discuss prevention with psyllium fiber and she agrees to take this regularly.  Today she is doing much better with no new GI complaints.  MB   8/17/2021 Yari presents for follow-up of history of perforated diverticulitis status post sigmoid resection.  She is 6 weeks postop and doing wonderful.  Dr. Babcock performed a sigmoid resection on July 7 with no significant postop complications.  She is back on her psyllium and having 2-3 loose soft bowel movements daily.  She has no more abdominal pain and is feeling wonderful postop.  Today we had a long discussion regarding diverticular disease and her recent surgery, she understands that she needs to continue psyllium based fiber.  Today she has no new GI complaints.  MB 10/18/2023 Juliana presents for follow-up of history of diverticulitis and change in bowel habits.  Since her last visit she has been doing well on psyllium 1 capsule twice daily.  Recently she has noticed increased incomplete evacuation with up to 5 bowel movements daily and alternating constipation with diarrhea.  Her last Colonoscopy Was Done Preop from Her Perforated Diverticulitis and Sigmoid Resection.  She Feels That Her Bowels Are Abnormal and Irregular.  She Denies Any Abdominal Pain.  She Is Very Concerned about a Recurrence of Diverticular Disease.  Today We Discussed the Bowel Regimen.  She Will Start a Half Capful of MiraLAX Daily.  We Will Schedule Repeat Colonoscopy Given Her Change in Bowel Habits.  MB 7/2/24 Juliana presents for colonoscopy follow up. Her colonoscopy shows intact anastamosis and left sided diverticular disease. She can't tolerate 4 capsules of metamucil with bloating.

## 2025-08-21 ENCOUNTER — OFFICE VISIT (OUTPATIENT)
Dept: URBAN - NONMETROPOLITAN AREA CLINIC 2 | Facility: CLINIC | Age: 75
End: 2025-08-21
Payer: MEDICARE

## 2025-08-21 DIAGNOSIS — K57.92 DIVERTICULITIS: ICD-10-CM

## 2025-08-21 DIAGNOSIS — Z12.11 ROUTINE COLON: ICD-10-CM

## 2025-08-21 DIAGNOSIS — R93.5 ABNORMAL CT OF THE ABDOMEN: ICD-10-CM

## 2025-08-21 DIAGNOSIS — K59.01 CONSTIPATION: ICD-10-CM

## 2025-08-21 PROCEDURE — 99213 OFFICE O/P EST LOW 20 MIN: CPT | Performed by: NURSE PRACTITIONER

## 2025-08-21 RX ORDER — CEFDINIR 300 MG/1
AS DIRECTED CAPSULE ORAL TID
Qty: 42 CAPSULE | Refills: 0 | Status: ON HOLD | COMMUNITY
Start: 2021-03-18

## 2025-08-21 RX ORDER — ESZOPICLONE 2 MG
TAKE 1 TABLET (2 MG) BY ORAL ROUTE ONCE DAILY AT BEDTIME TABLET ORAL 1
Qty: 0 | Refills: 0 | Status: ACTIVE | COMMUNITY
Start: 1900-01-01

## 2025-08-21 RX ORDER — METRONIDAZOLE 500 MG/1
1 TABLET TABLET, FILM COATED ORAL THREE TIMES A DAY
Qty: 42 TABLET | Refills: 0 | Status: ON HOLD | COMMUNITY
Start: 2021-03-18